# Patient Record
(demographics unavailable — no encounter records)

---

## 2024-11-26 NOTE — HISTORY OF PRESENT ILLNESS
[TextBox_4] : 79F PMH former smoker, moderately severe COPD, breast CA, pAFib on Eliquis, VFib arrest 2019 s/p AICD, obesity II, HTN, HLD, GERD, depression who presents for f/u visit. PFT today showed FEV1 58% with low FEV1/FVC and low DLCO. She has not used albuterol. No fevers or chills. No chest pains.

## 2024-11-26 NOTE — RESULTS/DATA
[TextEntry] : [facility] EXAM: [id number] - CT CHEST  - ORDERED BY: NORMA SAN   PROCEDURE DATE:  11/14/2024    INTERPRETATION:  CLINICAL INFORMATION: Interstitial prominence on chest x-ray, severe pulmonary hypertension.  COMPARISON: Chest x-ray 6/20/2014  CONTRAST/COMPLICATIONS: IV Contrast: NONE Oral Contrast: NONE   PROCEDURE: CT of the Chest was performed. Sagittal and coronal reformats were performed.  FINDINGS:  LUNGS AND LARGE AIRWAYS: Patent central airways. Linear atelectasis versus scarring in the right upper lobe, and mild subpleural scarring in the right upper and middle lobes compatible prior breast radiation. Multiple pulmonary nodules measuring less than 0.6 cm, for example, in the right upper lobe (series 5 image 482). PLEURA: No pleural effusion. VESSELS: The main pulmonary artery is wider in diameter than the ascending aorta, consistent with patient's reported history of pulmonary hypertension. Atherosclerotic changes of the aorta and coronary arteries. Single cardiac device lead. HEART: Heart size is normal. No pericardial effusion. MEDIASTINUM AND SHERI: No lymphadenopathy. CHEST WALL AND LOWER NECK: Left chest wall subcutaneous cardiac device. VISUALIZED UPPER ABDOMEN: Cholecystectomy. BONES: Degenerative changes. Chronic left anterior rib fracture deformities.  IMPRESSION:  Multiple pulmonary nodules measuring less than 0.6 cm    --- End of Report ---      ALEENA MADDEN MD; Resident Radiologist This document has been electronically signed. YOLA STILL M.D., ATTENDING RADIOLOGIST This document has been electronically signed. Nov 25 2024  3:09PM  ~~~~~~~~~~~~~~~~~~~~~~~~~~~~~~~~~~~~~~~~~~~~~~~~~~~~~~~~~~~~~~~~~~~~~~~~

## 2024-11-26 NOTE — ASSESSMENT
[FreeTextEntry1] : 79F PMH former smoker, moderately severe COPD, breast CA, pAFib on Eliquis, VFib arrest 2019 s/p AICD, obesity II, HTN, HLD, GERD, depression who presents for f/u visit.   - PFT today showed FEV1 58% with low FEV1/FVC and low DLCO - This is consistent with moderately-severe COPD - CT without significant interstitial findings - ILD panel negative - Will start Trelegy ellipta 100 - samples given x2 - Inhaler technique demonstrated at bedside - Nodules measuring up to 6mm in RUL - will repeat CT in 1 year 11/2025 - F/u in 3 mo time  The patient expressed understanding and agreement with the plan as outlined above and accepts responsibility to be compliant with any recommended testing, treatment, and follow-up visits.  All relevant questions and concerns were addressed.  30 minutes of time were spent on the encounter. Medical records were reviewed, including but not limited to hospital records, outpatient records, laboratory data, and diagnostic imaging studies. Greater than 50% of the face-to-face encounter time was spent on counseling and/or coordination of care.   Endy Scott MD, [facility] Pulmonary & Critical Care Medicine [facility] at [place] [address] [place], [place] [address] T: [phone] F: [phone]

## 2025-02-28 NOTE — PHYSICAL EXAM
[TextEntry] : Gen - In NAD, communicating easily and in full sentences HEENT - NC/AT CV - +S1, S2 Resp - B/L wheezing, cough Ext - No pedal edema Skin - No exposed rashes or lesions Neuro - Moves all four extremities Psych - Normal affect

## 2025-02-28 NOTE — HISTORY OF PRESENT ILLNESS
[Never] : never [TextBox_4] : 80F PMH former smoker, moderately severe COPD, breast CA, pAFib on Eliquis, VFib arrest 2019 s/p AICD, obesity II, HTN, HLD, GERD, depression who presents for f/u visit. She reports having cough and wheezing for several days.

## 2025-02-28 NOTE — ASSESSMENT
[FreeTextEntry1] : 80F PMH former smoker, moderately severe COPD, breast CA, pAFib on Eliquis, VFib arrest 2019 s/p AICD, obesity II, HTN, HLD, GERD, depression who presents for f/u visit.   - Likely with COPD exacerbation - Will start Prednisone taper - Advised to start Albuterol HFA in addition to Trelegy 200 - Advised to call if not better in a week - Due to CT 11/2025 - Will f/u in 3 mo time or sooner PRN  The patient expressed understanding and agreement with the plan as outlined above and accepts responsibility to be compliant with any recommended testing, treatment, and follow-up visits.  All relevant questions and concerns were addressed.  30 minutes of time were spent on the encounter. Medical records were reviewed, including but not limited to hospital records, outpatient records, laboratory data, and diagnostic imaging studies. Greater than 50% of the face-to-face encounter time was spent on counseling and/or coordination of care.   Endy Scott MD, [facility] at [place] [address] [place], [place] [address] T: [phone] F: [phone]

## 2025-05-06 NOTE — ASSESSMENT
[FreeTextEntry1] : 80F PMH former smoker, moderately severe COPD, breast CA, pAFib on Eliquis, VFib arrest 2019 s/p AICD, obesity II, HTN, HLD, GERD, depression who presents for f/u visit.   - C/w Trelegy 200 - Repeat CT ordered for 11/2025 to ensure stability of nodules - Can f/u routine visit in 4 mo with PFT  The patient expressed understanding and agreement with the plan as outlined above and accepts responsibility to be compliant with any recommended testing, treatment, and follow-up visits.  All relevant questions and concerns were addressed.  20 minutes of time were spent on the encounter. Medical records were reviewed, including but not limited to hospital records, outpatient records, laboratory data, and diagnostic imaging studies. Greater than 50% of the face-to-face encounter time was spent on counseling and/or coordination of care.   Endy Scott MD, [facility] Pulmonary & Critical Care Medicine [facility] at [place] [address] [place], [place] [address] T: [phone] F: [phone]

## 2025-05-06 NOTE — HISTORY OF PRESENT ILLNESS
[Never] : never [TextBox_4] : 80F PMH former smoker, moderately severe COPD, breast CA, pAFib on Eliquis, VFib arrest 2019 s/p AICD, obesity II, HTN, HLD, GERD, depression who presents for f/u visit. She otherwise feels well. She is feeling better since taking Prednisone at last visit. No fevers or chills or chest pains.